# Patient Record
Sex: FEMALE | Race: WHITE | NOT HISPANIC OR LATINO | Employment: UNEMPLOYED | URBAN - METROPOLITAN AREA
[De-identification: names, ages, dates, MRNs, and addresses within clinical notes are randomized per-mention and may not be internally consistent; named-entity substitution may affect disease eponyms.]

---

## 2018-06-29 ENCOUNTER — HOSPITAL ENCOUNTER (OUTPATIENT)
Dept: RADIOLOGY | Facility: HOSPITAL | Age: 38
Discharge: HOME | End: 2018-06-29
Payer: MEDICAID

## 2018-06-29 ENCOUNTER — TRANSCRIBE ORDERS (OUTPATIENT)
Dept: ADMINISTRATIVE | Facility: HOSPITAL | Age: 38
End: 2018-06-29

## 2018-06-29 DIAGNOSIS — R06.02 SHORTNESS OF BREATH: Primary | ICD-10-CM

## 2018-06-29 DIAGNOSIS — R06.02 SHORTNESS OF BREATH: ICD-10-CM

## 2018-06-29 PROCEDURE — 71046 X-RAY EXAM CHEST 2 VIEWS: CPT

## 2018-07-05 ENCOUNTER — HOSPITAL ENCOUNTER (EMERGENCY)
Facility: HOSPITAL | Age: 38
Discharge: HOME | End: 2018-07-06
Attending: EMERGENCY MEDICINE
Payer: MEDICAID

## 2018-07-05 ENCOUNTER — APPOINTMENT (EMERGENCY)
Dept: RADIOLOGY | Facility: HOSPITAL | Age: 38
End: 2018-07-05
Payer: MEDICAID

## 2018-07-05 VITALS
SYSTOLIC BLOOD PRESSURE: 117 MMHG | OXYGEN SATURATION: 98 % | RESPIRATION RATE: 16 BRPM | WEIGHT: 170 LBS | BODY MASS INDEX: 29.02 KG/M2 | DIASTOLIC BLOOD PRESSURE: 88 MMHG | HEART RATE: 78 BPM | HEIGHT: 64 IN | TEMPERATURE: 98.4 F

## 2018-07-05 DIAGNOSIS — J40 BRONCHITIS: Primary | ICD-10-CM

## 2018-07-05 LAB
ATRIAL RATE: 86 BPM
BACTERIA UR QL AUTO: ABNORMAL /HPF
BILIRUB UR QL STRIP: NEGATIVE
CLARITY UR: CLEAR
COLOR UR: YELLOW
EXT PREG TEST URINE: NORMAL
GLUCOSE UR STRIP-MCNC: NEGATIVE MG/DL
HGB UR QL STRIP.AUTO: NEGATIVE
KETONES UR STRIP-MCNC: NEGATIVE MG/DL
LEUKOCYTE ESTERASE UR QL STRIP: ABNORMAL
NITRITE UR QL STRIP: NEGATIVE
NON-SQ EPI CELLS URNS QL MICRO: ABNORMAL /HPF
P AXIS: 54 DEGREES
PH UR STRIP.AUTO: 7.5 [PH] (ref 4.5–8)
PR INTERVAL: 132 MS
PROT UR STRIP-MCNC: NEGATIVE MG/DL
QRS AXIS: 96 DEGREES
QRSD INTERVAL: 80 MS
QT INTERVAL: 378 MS
QTC INTERVAL: 452 MS
RBC #/AREA URNS AUTO: ABNORMAL /HPF
SP GR UR STRIP.AUTO: 1.01 (ref 1–1.03)
T WAVE AXIS: 30 DEGREES
UROBILINOGEN UR QL STRIP.AUTO: 0.2 E.U./DL
VENTRICULAR RATE: 86 BPM
WBC #/AREA URNS AUTO: ABNORMAL /HPF

## 2018-07-05 PROCEDURE — 81025 URINE PREGNANCY TEST: CPT | Performed by: EMERGENCY MEDICINE

## 2018-07-05 PROCEDURE — 81001 URINALYSIS AUTO W/SCOPE: CPT | Performed by: EMERGENCY MEDICINE

## 2018-07-05 PROCEDURE — 71046 X-RAY EXAM CHEST 2 VIEWS: CPT

## 2018-07-05 PROCEDURE — 93005 ELECTROCARDIOGRAM TRACING: CPT

## 2018-07-05 PROCEDURE — 93010 ELECTROCARDIOGRAM REPORT: CPT | Performed by: INTERNAL MEDICINE

## 2018-07-05 RX ORDER — IBUPROFEN 600 MG/1
600 TABLET ORAL ONCE
Status: COMPLETED | OUTPATIENT
Start: 2018-07-05 | End: 2018-07-05

## 2018-07-05 RX ORDER — AZITHROMYCIN 250 MG/1
500 TABLET, FILM COATED ORAL ONCE
Status: COMPLETED | OUTPATIENT
Start: 2018-07-05 | End: 2018-07-05

## 2018-07-05 RX ORDER — ALBUTEROL SULFATE 90 UG/1
2 AEROSOL, METERED RESPIRATORY (INHALATION) ONCE
Status: COMPLETED | OUTPATIENT
Start: 2018-07-05 | End: 2018-07-05

## 2018-07-05 RX ORDER — ALBUTEROL SULFATE 90 UG/1
2 AEROSOL, METERED RESPIRATORY (INHALATION) EVERY 4 HOURS PRN
Qty: 1 INHALER | Refills: 0 | Status: SHIPPED | OUTPATIENT
Start: 2018-07-05 | End: 2019-07-05

## 2018-07-05 RX ORDER — ALBUTEROL SULFATE 2.5 MG/3ML
5 SOLUTION RESPIRATORY (INHALATION) ONCE
Status: COMPLETED | OUTPATIENT
Start: 2018-07-05 | End: 2018-07-05

## 2018-07-05 RX ORDER — PREDNISONE 50 MG/1
50 TABLET ORAL DAILY
Qty: 5 TABLET | Refills: 0 | Status: SHIPPED | OUTPATIENT
Start: 2018-07-05 | End: 2018-07-10

## 2018-07-05 RX ORDER — AZITHROMYCIN 250 MG/1
TABLET, FILM COATED ORAL
Qty: 6 TABLET | Refills: 0 | Status: SHIPPED | OUTPATIENT
Start: 2018-07-06 | End: 2018-07-09

## 2018-07-05 RX ADMIN — ALBUTEROL SULFATE 5 MG: 2.5 SOLUTION RESPIRATORY (INHALATION) at 20:46

## 2018-07-05 RX ADMIN — IPRATROPIUM BROMIDE 0.5 MG: 0.5 SOLUTION RESPIRATORY (INHALATION) at 20:47

## 2018-07-05 RX ADMIN — AZITHROMYCIN 500 MG: 250 TABLET, FILM COATED ORAL at 22:51

## 2018-07-05 RX ADMIN — ALBUTEROL SULFATE 2 PUFF: 90 AEROSOL, METERED RESPIRATORY (INHALATION) at 22:51

## 2018-07-05 RX ADMIN — PREDNISONE 50 MG: 20 TABLET ORAL at 22:51

## 2018-07-05 RX ADMIN — CARBAMIDE PEROXIDE 6.5% 5 DROP: 6.5 LIQUID AURICULAR (OTIC) at 22:51

## 2018-07-05 RX ADMIN — IBUPROFEN 600 MG: 600 TABLET ORAL at 20:57

## 2018-07-06 PROCEDURE — 94640 AIRWAY INHALATION TREATMENT: CPT

## 2018-07-06 PROCEDURE — 99285 EMERGENCY DEPT VISIT HI MDM: CPT

## 2018-07-06 NOTE — DISCHARGE INSTRUCTIONS
Acute Bronchitis   WHAT YOU NEED TO KNOW:   Acute bronchitis is swelling and irritation in the air passages of your lungs  This irritation may cause you to cough or have other breathing problems  Acute bronchitis often starts because of another illness, such as a cold or the flu  The illness spreads from your nose and throat to your windpipe and airways  Bronchitis is often called a chest cold  Acute bronchitis lasts about 3 to 6 weeks and is usually not a serious illness  Your cough can last for several weeks  DISCHARGE INSTRUCTIONS:   Return to the emergency department if:   · You cough up blood  · Your lips or fingernails turn blue  · You feel like you are not getting enough air when you breathe  Contact your healthcare provider if:   · You have a fever  · Your breathing problems do not go away or get worse  · Your cough does not get better within 4 weeks  · You have questions or concerns about your condition or care  Self-care:   · Get more rest   Rest helps your body to heal  Slowly start to do more each day  Rest when you feel it is needed  · Avoid irritants in the air  Avoid chemicals, fumes, and dust  Wear a face mask if you must work around dust or fumes  Stay inside on days when air pollution levels are high  If you have allergies, stay inside when pollen counts are high  Do not use aerosol products, such as spray-on deodorant, bug spray, and hair spray  · Do not smoke or be around others who smoke  Nicotine and other chemicals in cigarettes and cigars damages the cilia that move mucus out of your lungs  Ask your healthcare provider for information if you currently smoke and need help to quit  E-cigarettes or smokeless tobacco still contain nicotine  Talk to your healthcare provider before you use these products  · Drink liquids as directed  Liquids help keep your air passages moist and help you cough up mucus   You may need to drink more liquids when you have acute bronchitis  Ask how much liquid to drink each day and which liquids are best for you  · Use a humidifier or vaporizer  Use a cool mist humidifier or a vaporizer to increase air moisture in your home  This may make it easier for you to breathe and help decrease your cough  Decrease risk for acute bronchitis:   · Get the vaccinations you need  Ask your healthcare provider if you should get vaccinated against the flu or pneumonia  · Prevent the spread of germs  You can decrease your risk of acute bronchitis and other illnesses by doing the following:     [facility] your hands often with soap and water  Carry germ-killing hand lotion or gel with you  You can use the lotion or gel to clean your hands when soap and water are not available  ¨ Do not touch your eyes, nose, or mouth unless you have washed your hands first     ¨ Always cover your mouth when you cough to prevent the spread of germs  It is best to cough into a tissue or your shirt sleeve instead of into your hand  Ask those around you cover their mouths when they cough  ¨ Try to avoid people who have a cold or the flu  If you are sick, stay away from others as much as possible  Medicines: Your healthcare provider may  give you any of the following:  · Ibuprofen or acetaminophen  are medicines that help lower your fever  They are available without a doctor's order  Ask your healthcare provider which medicine is right for you  Ask how much to take and how often to take it  Follow directions  These medicines can cause stomach bleeding if not taken correctly  Ibuprofen can cause kidney damage  Do not take ibuprofen if you have kidney disease, an ulcer, or allergies to aspirin  Acetaminophen can cause liver damage  Do not take more than 4,000 milligrams in 24 hours  · Decongestants  help loosen mucus in your lungs and make it easier to cough up  This can help you breathe easier  · Cough suppressants  decrease your urge to cough   If your cough produces mucus, do not take a cough suppressant unless your healthcare provider tells you to  Your healthcare provider may suggest that you take a cough suppressant at night so you can rest     · Inhalers  may be given  Your healthcare provider may give you one or more inhalers to help you breathe easier and cough less  An inhaler gives your medicine to open your airways  Ask your healthcare provider to show you how to use your inhaler correctly  · Take your medicine as directed  Contact your healthcare provider if you think your medicine is not helping or if you have side effects  Tell him of her if you are allergic to any medicine  Keep a list of the medicines, vitamins, and herbs you take  Include the amounts, and when and why you take them  Bring the list or the pill bottles to follow-up visits  Carry your medicine list with you in case of an emergency  Follow up with your healthcare provider as directed:  Write down questions you have so you will remember to ask them during your follow-up visits  © 2017 [employer] Information is for End User's use only and may not be sold, redistributed or otherwise used for commercial purposes  All illustrations and images included in CareNotes® are the copyrighted property of [employer]  or [employer]  The above information is an  only  It is not intended as medical advice for individual conditions or treatments  Talk to your doctor, nurse or pharmacist before following any medical regimen to see if it is safe and effective for you

## 2018-07-06 NOTE — ED PROVIDER NOTES
History  Chief Complaint   Patient presents with    Chest Pain     Patient c/o chest pain that she states started last Friday and has been on going since that time and getting increasingly worse  Patient comes from [facility]  75-year-old female with past medical history of prior alcoholism and here when abuse, who smokes is coming in today with complaint of chest pain associated with cough along with sinus congestion and right ear pain that has been going on for the past 1 week  She is currently in a drug rehabilitation facility here and has been clean from substances for over the past week  She has had subjective but no actual documented fevers no vomiting or diarrhea  States her boyfriend was recently just diagnosed with pneumonia  No missed periods  No leg pain or swelling  She had a chest x-ray done at the rehab facility and they said it looked clear  She came in because she feels like her symptoms are not improving  Patient has gotten treated as bronchitis in the past with steroids which have helped  History provided by:  Patient  Chest Pain   Pain location:  Substernal area  Pain quality: aching    Pain radiates to:  Does not radiate  Pain radiates to the back: no    Pain severity:  Moderate  Onset quality:  Gradual  Duration:  1 week  Timing:  Constant  Progression:  Worsening  Chronicity:  New  Context: breathing    Context: not lifting    Relieved by:  Nothing  Worsened by:  Nothing tried  Ineffective treatments: OTC meds  Associated symptoms: cough and fatigue    Associated symptoms: no nausea, no near-syncope, no syncope and not vomiting    Risk factors: smoking        None       Past Medical History:   Diagnosis Date    Endometriosis        Past Surgical History:   Procedure Laterality Date    ABDOMINAL SURGERY         History reviewed  No pertinent family history  I have reviewed and agree with the history as documented      Social History   Substance Use Topics  Smoking status: Current Every Day Smoker    Smokeless tobacco: Never Used    Alcohol use No        Review of Systems   Constitutional: Positive for fatigue  Respiratory: Positive for cough  Cardiovascular: Positive for chest pain  Negative for syncope and near-syncope  Gastrointestinal: Negative for nausea and vomiting  All other systems reviewed and are negative  Physical Exam  Physical Exam   Constitutional: She appears well-developed and well-nourished  No distress  HENT:   Head: Normocephalic and atraumatic  Right Ear: Tympanic membrane normal  Right ear exhibits lacerations (small abrasion to right ear canal from pt scratching, small amount of wax)  Left Ear: Tympanic membrane and ear canal normal    Eyes: EOM are normal  Pupils are equal, round, and reactive to light  Cardiovascular: Normal rate and regular rhythm  No murmur heard  Pulmonary/Chest: Effort normal  No respiratory distress  She has wheezes (few scattered wheezes with cough)  Abdominal: Soft  Bowel sounds are normal  She exhibits no distension  There is no tenderness  Musculoskeletal: She exhibits no edema or tenderness  Neurological: She is alert  Skin: She is not diaphoretic  Vitals reviewed        Vital Signs  ED Triage Vitals   Temperature Pulse Respirations Blood Pressure SpO2   07/05/18 1820 07/05/18 1820 07/05/18 1820 07/05/18 1820 07/05/18 1820   98 4 °F (36 9 °C) 80 16 120/79 96 %      Temp src Heart Rate Source Patient Position - Orthostatic VS BP Location FiO2 (%)   -- 07/05/18 1820 07/05/18 1820 07/05/18 1820 --    Monitor Sitting Right arm       Pain Score       07/05/18 2254       5           Vitals:    07/05/18 1820 07/05/18 2254   BP: 120/79 117/88   Pulse: 80 78   Patient Position - Orthostatic VS: Sitting        Visual Acuity      ED Medications  Medications   albuterol inhalation solution 5 mg (5 mg Nebulization Given 7/5/18 2046)   ipratropium (ATROVENT) 0 02 % inhalation solution 0 5 mg (0 5 mg Nebulization Given 7/5/18 2047)   ibuprofen (MOTRIN) tablet 600 mg (600 mg Oral Given 7/5/18 2057)   azithromycin (ZITHROMAX) tablet 500 mg (500 mg Oral Given 7/5/18 2251)   albuterol (PROVENTIL HFA,VENTOLIN HFA) inhaler 2 puff (2 puffs Inhalation Given 7/5/18 2251)   predniSONE tablet 50 mg (50 mg Oral Given 7/5/18 2251)   carbamide peroxide (DEBROX) 6 5 % otic solution 5 drop (5 drops Right Ear Given 7/5/18 2251)       Diagnostic Studies  Results Reviewed     Procedure Component Value Units Date/Time    Urine Microscopic [[id number]]  (Abnormal) Collected:  07/05/18 2058    Lab Status:  Final result Specimen:  Urine from Urine, Clean Catch Updated:  07/05/18 2113     RBC, UA None Seen /hpf      WBC, UA 2-4 (A) /hpf      Epithelial Cells Occasional /hpf      Bacteria, UA Moderate (A) /hpf     POCT pregnancy, urine [[id number]]  (Normal) Resulted:  07/05/18 2112    Lab Status:  Final result Updated:  07/05/18 2112     EXT PREG TEST UR (Ref: Negative) neg    UA w Reflex to Microscopic [[id number]]  (Abnormal) Collected:  07/05/18 2058    Lab Status:  Final result Specimen:  Urine from Urine, Clean Catch Updated:  07/05/18 2107     Color, UA Yellow     Clarity, UA Clear     Specific Gravity, UA 1 010     pH, UA 7 5     Leukocytes, UA Trace (A)     Nitrite, UA Negative     Protein, UA Negative mg/dl      Glucose, UA Negative mg/dl      Ketones, UA Negative mg/dl      Urobilinogen, UA 0 2 E U /dl      Bilirubin, UA Negative     Blood, UA Negative                 XR chest 2 views   ED Interpretation by Mariella Pizano MD (07/05 2158)   NAD                 Procedures  ECG 12 Lead Documentation  Date/Time: 7/5/2018 11:20 PM  Performed by: Josse Champagne  Authorized by: Josse Champagne     Indications / Diagnosis:  Cough/CP  ECG reviewed by me, the ED Provider: yes    Patient location:  ED  Rate:     ECG rate:  86    ECG rate assessment: normal    QRS:     QRS axis:  Right  T waves:     T waves: normal Phone Contacts  ED Phone Contact    ED Course                               MDM  Number of Diagnoses or Management Options  Bronchitis: new and requires workup     Amount and/or Complexity of Data Reviewed  Tests in the radiology section of CPT®: ordered and reviewed  Independent visualization of images, tracings, or specimens: yes    Patient Progress  Patient progress: improved (Patient felt improved after the neb treatment and was able to cough up some mucus  Discussed with her treatment for bronchitis with steroids, inhaler and a Z-Ronny given her smoking history and history of symptoms for 1 week  Patient asking for an ear drop for the lacks of her right ear  Discussed with her worsening signs and symptoms to return emergency department for )    CritCare Time    Disposition  Final diagnoses:   Bronchitis     Time reflects when diagnosis was documented in both MDM as applicable and the Disposition within this note     Time User Action Codes Description Comment    7/5/2018 10:18 PM Reanna Varela, [facility] Bronchitis       ED Disposition     ED Disposition Condition Comment    Discharge  Constance Gregory discharge to home/self care  Condition at discharge: Good        Follow-up Information     Follow up With Specialties Details Why Contact Info Additional [address] [facility] Emergency Department Emergency Medicine  If symptoms worsen [address] [place] [address]  [phone] [place] ED, [address] [place], [place], [facility]          Patient's Medications   Discharge Prescriptions    ALBUTEROL (PROVENTIL HFA,VENTOLIN HFA) 90 MCG/ACT INHALER    Inhale 2 puffs every 4 (four) hours as needed for wheezing       Start Date: 7/5/2018  End Date: 7/5/2019       Order Dose: 2 puffs       Quantity: 1 Inhaler    Refills: 0    AZITHROMYCIN (ZITHROMAX Z-RONNY) 250 MG TABLET    Take 2 tablets by mouth on day 1, then 1 tablet daily for the remaining 4 days  Start Date: 7/6/2018  End Date: 7/9/2018       Order Dose: --       Quantity: 6 tablet    Refills: 0    PREDNISONE 50 MG TABLET    Take 1 tablet (50 mg total) by mouth daily for 5 days       Start Date: 7/5/2018  End Date: 7/10/2018       Order Dose: 50 mg       Quantity: 5 tablet    Refills: 0     No discharge procedures on file      ED Provider  Electronically Signed by           Mychal Arevalo MD  07/05/18 4044

## 2018-07-08 ENCOUNTER — HOSPITAL ENCOUNTER (EMERGENCY)
Facility: HOSPITAL | Age: 38
Discharge: HOME | End: 2018-07-08
Attending: EMERGENCY MEDICINE | Admitting: EMERGENCY MEDICINE
Payer: MEDICAID

## 2018-07-08 VITALS
RESPIRATION RATE: 16 BRPM | DIASTOLIC BLOOD PRESSURE: 76 MMHG | OXYGEN SATURATION: 98 % | SYSTOLIC BLOOD PRESSURE: 124 MMHG | HEART RATE: 88 BPM | TEMPERATURE: 98.7 F

## 2018-07-08 DIAGNOSIS — F44.5 PSYCHOGENIC NONEPILEPTIC SEIZURE: Primary | ICD-10-CM

## 2018-07-08 DIAGNOSIS — J40 BRONCHITIS: ICD-10-CM

## 2018-07-08 LAB
ANION GAP SERPL CALCULATED.3IONS-SCNC: 10 MMOL/L (ref 4–13)
BASOPHILS # BLD AUTO: 0.01 THOUSANDS/ΜL (ref 0–0.1)
BASOPHILS NFR BLD AUTO: 0 % (ref 0–1)
BUN SERPL-MCNC: 10 MG/DL (ref 5–25)
CALCIUM SERPL-MCNC: 9.4 MG/DL (ref 8.3–10.1)
CHLORIDE SERPL-SCNC: 103 MMOL/L (ref 100–108)
CO2 SERPL-SCNC: 24 MMOL/L (ref 21–32)
CREAT SERPL-MCNC: 0.93 MG/DL (ref 0.6–1.3)
EOSINOPHIL # BLD AUTO: 0 THOUSAND/ΜL (ref 0–0.61)
EOSINOPHIL NFR BLD AUTO: 0 % (ref 0–6)
ERYTHROCYTE [DISTWIDTH] IN BLOOD BY AUTOMATED COUNT: 12.6 % (ref 11.6–15.1)
GFR SERPL CREATININE-BSD FRML MDRD: 78 ML/MIN/1.73SQ M
GLUCOSE SERPL-MCNC: 126 MG/DL (ref 65–140)
HCT VFR BLD AUTO: 39.4 % (ref 34.8–46.1)
HGB BLD-MCNC: 13.4 G/DL (ref 11.5–15.4)
IMM GRANULOCYTES # BLD AUTO: 0.03 THOUSAND/UL (ref 0–0.2)
IMM GRANULOCYTES NFR BLD AUTO: 1 % (ref 0–2)
LYMPHOCYTES # BLD AUTO: 0.88 THOUSANDS/ΜL (ref 0.6–4.47)
LYMPHOCYTES NFR BLD AUTO: 17 % (ref 14–44)
MCH RBC QN AUTO: 27.8 PG (ref 26.8–34.3)
MCHC RBC AUTO-ENTMCNC: 34 G/DL (ref 31.4–37.4)
MCV RBC AUTO: 82 FL (ref 82–98)
MONOCYTES # BLD AUTO: 0.22 THOUSAND/ΜL (ref 0.17–1.22)
MONOCYTES NFR BLD AUTO: 4 % (ref 4–12)
NEUTROPHILS # BLD AUTO: 4.04 THOUSANDS/ΜL (ref 1.85–7.62)
NEUTS SEG NFR BLD AUTO: 78 % (ref 43–75)
NRBC BLD AUTO-RTO: 0 /100 WBCS
PLATELET # BLD AUTO: 311 THOUSANDS/UL (ref 149–390)
PMV BLD AUTO: 10.3 FL (ref 8.9–12.7)
POTASSIUM SERPL-SCNC: 4.1 MMOL/L (ref 3.5–5.3)
RBC # BLD AUTO: 4.82 MILLION/UL (ref 3.81–5.12)
SODIUM SERPL-SCNC: 137 MMOL/L (ref 136–145)
WBC # BLD AUTO: 5.18 THOUSAND/UL (ref 4.31–10.16)

## 2018-07-08 PROCEDURE — 96360 HYDRATION IV INFUSION INIT: CPT

## 2018-07-08 PROCEDURE — 36415 COLL VENOUS BLD VENIPUNCTURE: CPT | Performed by: EMERGENCY MEDICINE

## 2018-07-08 PROCEDURE — 99284 EMERGENCY DEPT VISIT MOD MDM: CPT

## 2018-07-08 PROCEDURE — 96361 HYDRATE IV INFUSION ADD-ON: CPT

## 2018-07-08 PROCEDURE — 85025 COMPLETE CBC W/AUTO DIFF WBC: CPT | Performed by: EMERGENCY MEDICINE

## 2018-07-08 PROCEDURE — 96372 THER/PROPH/DIAG INJ SC/IM: CPT

## 2018-07-08 PROCEDURE — 80048 BASIC METABOLIC PNL TOTAL CA: CPT | Performed by: EMERGENCY MEDICINE

## 2018-07-08 RX ORDER — ALBUTEROL SULFATE 90 UG/1
2 AEROSOL, METERED RESPIRATORY (INHALATION) ONCE
Status: COMPLETED | OUTPATIENT
Start: 2018-07-08 | End: 2018-07-08

## 2018-07-08 RX ORDER — OLANZAPINE 10 MG/1
10 INJECTION, POWDER, LYOPHILIZED, FOR SOLUTION INTRAMUSCULAR ONCE
Status: COMPLETED | OUTPATIENT
Start: 2018-07-08 | End: 2018-07-08

## 2018-07-08 RX ORDER — ROPINIROLE 0.25 MG/1
0.25 TABLET, FILM COATED ORAL ONCE
Status: COMPLETED | OUTPATIENT
Start: 2018-07-08 | End: 2018-07-08

## 2018-07-08 RX ADMIN — OLANZAPINE 10 MG: 10 INJECTION, POWDER, FOR SOLUTION INTRAMUSCULAR at 18:27

## 2018-07-08 RX ADMIN — SODIUM CHLORIDE 1000 ML: 0.9 INJECTION, SOLUTION INTRAVENOUS at 19:44

## 2018-07-08 RX ADMIN — ROPINIROLE 0.25 MG: 0.25 TABLET, FILM COATED ORAL at 21:48

## 2018-07-08 RX ADMIN — ALBUTEROL SULFATE 2 PUFF: 90 AEROSOL, METERED RESPIRATORY (INHALATION) at 19:45

## 2018-07-08 RX ADMIN — WATER 10 ML: 1 INJECTION INTRAMUSCULAR; INTRAVENOUS; SUBCUTANEOUS at 18:28

## 2018-07-08 NOTE — ED PROVIDER NOTES
History  Chief Complaint   Patient presents with    Seizure - Prior Hx Of     pt presents from [facility] for a report of seizures, pt presents flopping around, pt alert and being verbally agressive toward staff  75-year-old female sent in from [facility] recovery, she is is experiencing psychogenic seizures  She has purposeful tonic clonic shaking  When I tell the patient that these are psychogenic in nature, the patient is able to stop her seizure-like activity  She is able to talk with me through her seizure-like activity  She has no neurologic deficit  She has what appears to be purposeful tonic clonic movements  She is at [facility] recovery to attempt to get over benzo and heroin addiction  She has not had benzos or heroin in the past 2 weeks and I do not think that this is benzodiazepine withdrawal   This appears like psychogenic seizures  Patient is feeling improved after Zyprexa  Prior to Admission Medications   Prescriptions Last Dose Informant Patient Reported? Taking? albuterol (PROVENTIL HFA,VENTOLIN HFA) 90 mcg/act inhaler   No No   Sig: Inhale 2 puffs every 4 (four) hours as needed for wheezing   azithromycin (ZITHROMAX Z-MARY LOU) 250 mg tablet   No No   Sig: Take 2 tablets by mouth on day 1, then 1 tablet daily for the remaining 4 days  predniSONE 50 mg tablet   No No   Sig: Take 1 tablet (50 mg total) by mouth daily for 5 days      Facility-Administered Medications: None       Past Medical History:   Diagnosis Date    Endometriosis        Past Surgical History:   Procedure Laterality Date    ABDOMINAL SURGERY         History reviewed  No pertinent family history  I have reviewed and agree with the history as documented      Social History   Substance Use Topics    Smoking status: Current Every Day Smoker    Smokeless tobacco: Never Used    Alcohol use No        Review of Systems   Unable to perform ROS: Acuity of condition   Constitutional: Negative for chills and fever  Gastrointestinal: Negative for abdominal pain, nausea and vomiting  Genitourinary: Negative for dysuria and flank pain  Skin: Negative for rash and wound  Neurological: Negative for light-headedness and headaches  Psychiatric/Behavioral: Positive for sleep disturbance  Negative for confusion and hallucinations  The patient is nervous/anxious  Psychogenic seizures       Physical Exam  Physical Exam   Constitutional: She is oriented to person, place, and time  She appears well-developed and well-nourished  No distress  HENT:   Head: Normocephalic and atraumatic  Mouth/Throat: Oropharynx is clear and moist    Eyes: Conjunctivae and EOM are normal    Neck: Normal range of motion  Cardiovascular: Normal rate and regular rhythm  Pulmonary/Chest: Effort normal  No respiratory distress  Abdominal: Soft  There is no tenderness  Musculoskeletal: Normal range of motion  She exhibits no tenderness  Neurological: She is alert and oriented to person, place, and time  No cranial nerve deficit or sensory deficit  Repeated psychogenic seizures while in the evaulation room - able to talk through them  Purposeful tonic-clonic movements however does not appear seizure like and patient is able to stop when told to stop the behavior  Skin: Skin is warm and dry  She is not diaphoretic  No pallor  Psychiatric:   Psychogenic seizure   Vitals reviewed        Vital Signs  ED Triage Vitals [07/08/18 1819]   Temperature Pulse Respirations Blood Pressure SpO2   98 7 °F (37 1 °C) 99 18 128/87 97 %      Temp Source Heart Rate Source Patient Position - Orthostatic VS BP Location FiO2 (%)   Oral Monitor Lying Right arm --      Pain Score       --           Vitals:    07/08/18 1819 07/08/18 2039   BP: 128/87 124/76   Pulse: 99 88   Patient Position - Orthostatic VS: Lying        Visual Acuity  Visual Acuity      Most Recent Value   L Pupil Size (mm)  3   R Pupil Size (mm)  3          ED Medications  Medications   rOPINIRole (REQUIP) tablet 0 25 mg (not administered)   OLANZapine (ZyPREXA) IM injection 10 mg (10 mg Intramuscular Given 7/8/18 1827)   sterile water injection **AcuDose Override Pull** (10 mL  Given 7/8/18 1828)   sodium chloride 0 9 % bolus 1,000 mL (1,000 mL Intravenous New Bag 7/8/18 1944)   albuterol (PROVENTIL HFA,VENTOLIN HFA) inhaler 2 puff (2 puffs Inhalation Given 7/8/18 1945)       Diagnostic Studies  Results Reviewed     Procedure Component Value Units Date/Time    Basic metabolic panel [[id number]] Collected:  07/08/18 1943    Lab Status:  Final result Specimen:  Blood from Arm, Left Updated:  07/08/18 2002     Sodium 137 mmol/L      Potassium 4 1 mmol/L      Chloride 103 mmol/L      CO2 24 mmol/L      Anion Gap 10 mmol/L      BUN 10 mg/dL      Creatinine 0 93 mg/dL      Glucose 126 mg/dL      Calcium 9 4 mg/dL      eGFR 78 ml/min/1 73sq m     Narrative:         National Kidney Disease Education Program recommendations are as follows:  GFR calculation is accurate only with a steady state creatinine  Chronic Kidney disease less than 60 ml/min/1 73 sq  meters  Kidney failure less than 15 ml/min/1 73 sq  meters      CBC and differential [[id number]]  (Abnormal) Collected:  07/08/18 1943    Lab Status:  Final result Specimen:  Blood from Arm, Left Updated:  07/08/18 1950     WBC 5 18 Thousand/uL      RBC 4 82 Million/uL      Hemoglobin 13 4 g/dL      Hematocrit 39 4 %      MCV 82 fL      MCH 27 8 pg      MCHC 34 0 g/dL      RDW 12 6 %      MPV 10 3 fL      Platelets 459 Thousands/uL      nRBC 0 /100 WBCs      Neutrophils Relative 78 (H) %      Immat GRANS % 1 %      Lymphocytes Relative 17 %      Monocytes Relative 4 %      Eosinophils Relative 0 %      Basophils Relative 0 %      Neutrophils Absolute 4 04 Thousands/µL      Immature Grans Absolute 0 03 Thousand/uL      Lymphocytes Absolute 0 88 Thousands/µL      Monocytes Absolute 0 22 Thousand/µL      Eosinophils Absolute 0 00 Thousand/µL      Basophils Absolute 0 01 Thousands/µL     UA w Reflex to Microscopic w Reflex to Culture [[id number]]     Lab Status:  No result Specimen:  Urine                  No orders to display              Procedures  Procedures       Phone Contacts  ED Phone Contact    ED Course  ED Course as of Jul 08 2129   Teresa Jorge Jul 08, 2018   4486 Patient is still not having any seizure-like activity  She is having blood work performed, IV fluid  Hopefully will discharge later today back to her rehabilitation facility  2002 Normal blood work  No cause for repeat seizure activity  This is all likely psychogenic seizures  MDM  Number of Diagnoses or Management Options  Diagnosis management comments: Second Perdue seizures  Patient is advised that she is not having real seizures, they appear psychogenic because she is able to talk through them  As such patient is able to stop herself from having seizures  She is given Zyprexa to help with the psychogenicSymptoms  She is concerned that this is secondary to steroid use so we will stop her steroids, will give her IV fluid to help, check basic lab work  No further imaging or tests is needed as this appears classic psychogenic seizure  Amount and/or Complexity of Data Reviewed  Clinical lab tests: ordered and reviewed      CritCare Time    Disposition  Final diagnoses:   Psychogenic nonepileptic seizure   Bronchitis     Time reflects when diagnosis was documented in both MDM as applicable and the Disposition within this note     Time User Action Codes Description Comment    7/8/2018  9:17 PM Jessie Payan Add [F44 5] Psychogenic nonepileptic seizure     7/8/2018  9:17 PM Sheila, [address] Bronchitis       ED Disposition     ED Disposition Condition Comment    Discharge  Miki Alisson discharge to home/self care      Condition at discharge: Good        Follow-up Information     Follow up With Specialties Details Why Contact Info Additional Information    [address] [facility] Emergency Department Emergency Medicine  If symptoms worsen: repeat seizure activity, fever/chills, headache, etc Hai Brown [facility] [address] [place] ED, [address] [place], [place], [address] [facility]  Call as needed to find a PCP in the area [phone]             Patient's Medications   Discharge Prescriptions    No medications on file     No discharge procedures on file      ED Provider  Electronically Signed by           Lyndon Tsai DO  07/08/18 0289

## 2018-07-09 NOTE — DISCHARGE INSTRUCTIONS
Acute Bronchitis   AMBULATORY CARE:   Acute bronchitis  is swelling and irritation in the air passages of your lungs  This irritation may cause you to cough or have other breathing problems  Acute bronchitis often starts because of another illness, such as a cold or the flu  The illness spreads from your nose and throat to your windpipe and airways  Bronchitis is often called a chest cold  Acute bronchitis lasts about 3 to 6 weeks and is usually not a serious illness  Your cough can last for several weeks  You may have any of the following symptoms:   · A cough with sputum that may be clear, yellow, or green    · Feeling more tired than usual, and body aches    · A fever and chills    · Wheezing when you breathe    · A tight chest or pain when you breathe or cough  Seek care immediately if:   · You cough up blood  · Your lips or fingernails turn blue  · You feel like you are not getting enough air when you breathe  Contact your healthcare provider if:   · You have a fever  · Your breathing problems do not go away or get worse  · Your cough does not get better within 4 weeks  · You have questions or concerns about your condition or care  Self-care:   · Get more rest   Rest helps your body to heal  Slowly start to do more each day  Rest when you feel it is needed  · Avoid irritants in the air  Avoid chemicals, fumes, and dust  Wear a face mask if you must work around dust or fumes  Stay inside on days when air pollution levels are high  If you have allergies, stay inside when pollen counts are high  Do not use aerosol products, such as spray-on deodorant, bug spray, and hair spray  · Do not smoke or be around others who smoke  Nicotine and other chemicals in cigarettes and cigars damages the cilia that move mucus out of your lungs  Ask your healthcare provider for information if you currently smoke and need help to quit  E-cigarettes or smokeless tobacco still contain nicotine   Talk to your healthcare provider before you use these products  · Drink liquids as directed  Liquids help keep your air passages moist and help you cough up mucus  You may need to drink more liquids when you have acute bronchitis  Ask how much liquid to drink each day and which liquids are best for you  · Use a humidifier or vaporizer  Use a cool mist humidifier or a vaporizer to increase air moisture in your home  This may make it easier for you to breathe and help decrease your cough  Prevent acute bronchitis by doing the following:   · Get the vaccinations you need  Ask your healthcare provider if you should get vaccinated against the flu or pneumonia  · Prevent the spread of germs  You can decrease your risk of acute bronchitis and other illnesses by doing the following:     [facility] your hands often with soap and water  Carry germ-killing hand lotion or gel with you  You can use the lotion or gel to clean your hands when soap and water are not available  ¨ Do not touch your eyes, nose, or mouth unless you have washed your hands first     ¨ Always cover your mouth when you cough to prevent the spread of germs  It is best to cough into a tissue or your shirt sleeve instead of into your hand  Ask those around you cover their mouths when they cough  ¨ Try to avoid people who have a cold or the flu  If you are sick, stay away from others as much as possible  Medicines: Your healthcare provider may  give you any of the following:  · Ibuprofen or acetaminophen  are medicines that help lower your fever  They are available without a doctor's order  Ask your healthcare provider which medicine is right for you  Ask how much to take and how often to take it  Follow directions  These medicines can cause stomach bleeding if not taken correctly  Ibuprofen can cause kidney damage  Do not take ibuprofen if you have kidney disease, an ulcer, or allergies to aspirin  Acetaminophen can cause liver damage   Do not take more than 4,000 milligrams in 24 hours  · Decongestants  help loosen mucus in your lungs and make it easier to cough up  This can help you breathe easier  · Cough suppressants  decrease your urge to cough  If your cough produces mucus, do not take a cough suppressant unless your healthcare provider tells you to  Your healthcare provider may suggest that you take a cough suppressant at night so you can rest     · Inhalers  may be given  Your healthcare provider may give you one or more inhalers to help you breathe easier and cough less  An inhaler gives your medicine to open your airways  Ask your healthcare provider to show you how to use your inhaler correctly  Follow up with your healthcare provider as directed:  Write down questions you have so you will remember to ask them during your follow-up visits  © 2017 [employer]  Information is for End User's use only and may not be sold, redistributed or otherwise used for commercial purposes  All illustrations and images included in CareNotes® are the copyrighted property of [employer]  or [employer]  The above information is an  only  It is not intended as medical advice for individual conditions or treatments  Talk to your doctor, nurse or pharmacist before following any medical regimen to see if it is safe and effective for you  Conversion Disorder   WHAT YOU NEED TO KNOW:   What is conversion disorder? Conversion disorder is a condition that causes you to have symptoms of nerve problems you cannot control  It may also be called functional neurologic symptom disorder  The nerve problems are not caused by a medical condition  A stressful or traumatic event usually triggers these problems  Your risk for conversion disorder is higher if you have depression, an anxiety disorder, or posttraumatic stress disorder (PTSD)  What are the signs and symptoms of conversion disorder?    · Numbness or loss of feeling in a body area    · Blindness or tunnel vision    · Hearing problems or deafness    · Not being able to speak    · Weakness or paralysis    · Tremors or jerking motions you cannot control    · Seizures or spells    · Trouble walking  How is conversion disorder diagnosed? Conversion disorder can cause symptoms that look like a medical emergency, such as a stroke or paralysis  Your healthcare provider will check you for a medical condition that could be causing your symptoms  The tests you may need will depend on your symptoms  You may also need to see a neurologist (nerve specialist) to check for problems that need to be treated  If no medical condition is found, your healthcare provider may talk to you about working with a mental health specialist  The specialist can help you talk about any stress or anxiety you are feeling  How is conversion disorder managed? Signs and symptoms of conversion disorder usually last a short time, and treatment is not needed  The following may help you manage conversion disorder and reduce your symptoms:  · Therapy  can help you work through any anxiety or stress you may be feeling  A therapist will talk with you about anything difficult that is happening now or that happened in the past  You can talk with the therapist about what you did to handle the stress  The therapist may also help you understand how your signs and symptoms are related to how you are feeling  You may be able to learn new ways to handle anxiety or stress  You may have therapy alone or with members of your family  You may learn to replace negative thoughts with positive thoughts  · Physical or occupational therapy  can help you as you recover  A physical therapist can teach you exercises to help build muscles or improve balance  An occupational therapist can help you learn new ways to do your daily activities until your symptoms are gone       · Medicines  are sometimes used to help control anxiety or to improve your mood  These medicines are used together with therapy or other treatments  When should I contact my healthcare provider? · Your signs or symptoms come back after treatment  · You have new or worsening signs or symptoms  · You have questions or concerns about your condition or care  CARE AGREEMENT:   You have the right to help plan your care  Learn about your health condition and how it may be treated  Discuss treatment options with your caregivers to decide what care you want to receive  You always have the right to refuse treatment  The above information is an  only  It is not intended as medical advice for individual conditions or treatments  Talk to your doctor, nurse or pharmacist before following any medical regimen to see if it is safe and effective for you  © 2017 [employer]  Information is for End User's use only and may not be sold, redistributed or otherwise used for commercial purposes  All illustrations and images included in CareNotes® are the copyrighted property of [employer]  or [employer]  Recurrent Seizures in Adults   WHAT YOU NEED TO KNOW:   What is a recurrent seizure? A seizure is an episode of abnormal brain activity  A seizure can cause jerky muscle movements, loss of consciousness, or confusion  Recurrent means you have a seizure more than once  The cause of your seizures may not be known  Some common triggers are alcohol, drugs, lack of sleep, fever, or a virus  High or low blood sugar levels can also trigger a seizure  How is a recurrent seizure treated? You may need seizure medicine if you do not already take it  If you currently take seizure medicine, the dose or type of medicine may need be changed  Recurrent seizures may occur if you do not take antiseizure medicine as directed  Surgery may be needed to remove a tumor or fix a problem in your brain  What can I do to help prevent seizures?    · Take your antiseizure medicine every day at the same time  This will also help reduce side effects  Do not skip any doses  Do not stop taking this medicine unless directed by a healthcare provider  · Manage stress  Stress can trigger a seizure  Exercise can help you reduce stress  Talk to your healthcare provider about exercise that is safe for you  Other ways to manage stress include yoga, meditation, and biofeedback  Illness can be a form of stress  Eat a variety of healthy foods and drink plenty of liquids during an illness  · Set a regular sleep schedule  A lack of sleep can trigger a seizure  Try to go to sleep and wake up at the same times every day  Keep your bedroom quiet and dark  Talk to your healthcare provider if you are having trouble sleeping  · Manage other medical conditions  Manage other health conditions that may increase your risk for a seizure  Keep your blood sugar levels and blood pressure under control  · Limit or do not drink alcohol as directed  Alcohol can trigger a seizure, especially if you drink a large amount at one time  A drink of alcohol is 12 ounces of beer, 1½ ounces of liquor, or 5 ounces of wine  Talk to your healthcare provider about a safe amount of alcohol for you  Your provider may recommend that you do not drink any alcohol  Tell him or her if you need help to quit drinking  What can I do to manage recurrent seizures? · Ask what safety precautions you should take  Talk with your healthcare provider about driving  You may not be able to drive until you are seizure-free for a period of time  You will need to check the law where you live  Also talk to your healthcare provider about swimming and bathing  You may drown or develop life-threatening heart or lung damage if you have a seizure in water  · Tell your friends, family members, and coworkers that you had a seizure    Give them the following instructions to use if you have another seizure:     ¨ Do not panic     ¨ Gently guide me to the floor or a soft surface  ¨ Do not hold me down or put anything in my mouth  ¨ Place me on my side to help prevent me from swallowing saliva or vomit  ¨ Protect me from injury  Remove sharp or hard objects from the area surrounding me, or cushion my head  ¨ Loosen the clothing around my head and neck  ¨ Time how long my seizure lasts  Call 911 if my seizure lasts longer than 5 minutes or if I have a second seizure  ¨ Stay with me until my seizure ends  Let me rest until I am fully awake  ¨ Perform CPR if I stop breathing or you cannot feel my pulse  ¨ Do not give me anything to eat or drink until I am fully awake  Call 911 or have someone else call for any of the following:   · Your seizure lasts longer than 5 minutes  · You have a second seizure within 24 hours of your first     · You have trouble breathing after a seizure  · You cannot be woken after your seizure  · You have more than 1 seizure before you are fully awake or aware  · You have diabetes or are pregnant and have a seizure  · You have a seizure in water  When should I seek immediate care? · You are injured during a seizure  When should I contact my healthcare provider? · You have a fever  · You are planning to get pregnant or are currently pregnant  · You have questions or concerns about your condition or care  CARE AGREEMENT:   You have the right to help plan your care  Learn about your health condition and how it may be treated  Discuss treatment options with your caregivers to decide what care you want to receive  You always have the right to refuse treatment  The above information is an  only  It is not intended as medical advice for individual conditions or treatments  Talk to your doctor, nurse or pharmacist before following any medical regimen to see if it is safe and effective for you    © 2017 [employer]  Information is for End User's use only and may not be sold, redistributed or otherwise used for commercial purposes  All illustrations and images included in CareNotes® are the copyrighted property of [employer]  or [employer]

## 2018-07-09 NOTE — ED NOTES
Called [facility] x 4 times before reaching a nurse  Instructed to fax discharge paperwork for them to arrange transportation back to facility        Adrienne Green RN  07/08/18 [id number]

## 2018-10-05 ENCOUNTER — HOSPITAL ENCOUNTER (INPATIENT)
Dept: HOSPITAL 74 - YASAS | Age: 38
LOS: 5 days | Discharge: HOME | DRG: 773 | End: 2018-10-10
Attending: INTERNAL MEDICINE | Admitting: INTERNAL MEDICINE
Payer: COMMERCIAL

## 2018-10-05 VITALS — BODY MASS INDEX: 28.1 KG/M2

## 2018-10-05 DIAGNOSIS — L03.115: ICD-10-CM

## 2018-10-05 DIAGNOSIS — Z86.69: ICD-10-CM

## 2018-10-05 DIAGNOSIS — F13.230: ICD-10-CM

## 2018-10-05 DIAGNOSIS — G47.00: ICD-10-CM

## 2018-10-05 DIAGNOSIS — F17.210: ICD-10-CM

## 2018-10-05 DIAGNOSIS — F41.9: ICD-10-CM

## 2018-10-05 DIAGNOSIS — F19.24: ICD-10-CM

## 2018-10-05 DIAGNOSIS — F11.23: Primary | ICD-10-CM

## 2018-10-05 DIAGNOSIS — B18.2: ICD-10-CM

## 2018-10-05 DIAGNOSIS — F32.9: ICD-10-CM

## 2018-10-05 PROCEDURE — HZ2ZZZZ DETOXIFICATION SERVICES FOR SUBSTANCE ABUSE TREATMENT: ICD-10-PCS | Performed by: SURGERY

## 2018-10-05 RX ADMIN — NICOTINE SCH: 21 PATCH TRANSDERMAL at 18:24

## 2018-10-05 RX ADMIN — DOCUSATE SODIUM SCH MG: 100 CAPSULE, LIQUID FILLED ORAL at 22:42

## 2018-10-05 RX ADMIN — Medication SCH: at 23:24

## 2018-10-05 RX ADMIN — NICOTINE POLACRILEX PRN MG: 2 GUM, CHEWING ORAL at 18:24

## 2018-10-05 RX ADMIN — HYDROXYZINE PAMOATE PRN MG: 25 CAPSULE ORAL at 18:26

## 2018-10-05 RX ADMIN — LEVETIRACETAM SCH MG: 250 TABLET, FILM COATED ORAL at 22:42

## 2018-10-05 NOTE — HP
COWS





- Scale


Resting Pulse: 0= MD 80 or Below


Sweatin= Chills/Flushing


Restless Observation: 3= Extraneous Movement


Pupil Size: 1= Pupils >than Normal


Bone or Joint Aches: 2= Severe Diffuse Aches


Runny Nose/ Eye Tearin= Runny Nose/Eyes


GI Upset > 30mins: 2= Nausea/Diarrhea


Tremor Observation: 2= Slight Tremor Visible


Yawning Observation: 2= >3x During Session


Anxiety or Irritability: 2=Irritable/Anxious


Goose Flesh Skin: 0=Smooth Skin


COWS Score: 17





[facility]





- hospitals


Chief Complaint: 





i need help to sto using heroin


Allergies/Adverse Reactions: 


 Allergies











Allergy/AdvReac Type Severity Reaction Status Date / Time


 


No Known Allergies Allergy   Verified 10/05/18 13:48











History of Present Illness: 





this 37 years old female with heroin dependence,seeking detox,withdrawal symptom

[facility] 


hepat[facility] c treated 


anxiety,depression,insomnia


iv drug user heroin


weight loss


seizure last 


anxiety,depression,insomnia


swelling with pain in the right ankle for 1 week at the siite of injection


Exam Limitations: No Limitations





- Ebola screening


Have you traveled outside of the country in the last 21 days: No


Have you had contact with anyone from an Ebola affected area: No


Have you been sick,other than usual withdrawal symptoms: No


Do you have a fever: No





- Review of Systems


Constitutional: Chills, Loss of Appetite, Malaise, Night Sweats, Changes in 

sleep, Weakness, Unintentional Wgt. Loss


EENT: reports: Tearing, Ear Pain, Nose Congestion


Respiratory: reports: No Symptoms reported


Cardiac: reports: No Symptoms Reported


GI: reports: Nausea, Poor Appetite, Vomiting, Abdominal cramping


: reports: No Symptoms Reported


Musculoskeletal: reports: Back Pain, Joint Pain, Muscle Pain, Joint Stiffness


Integumentary: reports: Dryness


Neuro: reports: Headache, Tremors


Endocrine: reports: No Symptoms Reported


Hematology: reports: No Symptoms Reported


Psychiatric: reports: No Sypmtoms Reported, Judgement Intact, Mood/Affect 

Appropiate, Orientated x3 (insomnia), Anxious, Depressed





Patient History





- Patient Medical History


Hx Asthma: No


Hx Chronic Obstructive Pulmonary Disease (COPD): No


Hx Cardiac Disorders: No


Hx Hypertension: No


Hx Seizures: Yes (WITHDRAWAL SEIZURE - LAST EPISODE )


Hx Diabetes: No


Hx Gastrointestinal Disorders: No


Hx Genitourinary Disorders: Yes (ENDOMETRIOSIS)


Hx Sexually Transmitted Disorders: No


Hx Renal Disease (ESRD): No


Hx Human Immunodeficiency Virus (HIV): No


Hx Hepatitis C: No


Hx Depression: Yes (AND ANXIETY)


Hx Suicide Attempt: No


Hx Bipolar Disorder: No


Hx Schizophrenia: No


Other Medical History: no suicidal,no homicidal





- Patient Surgical History


Past Surgical History: Yes


Hx Neurologic Surgery: No


Hx Cataract Extraction: No


Hx Cardiac Surgery: No


Hx Lung Surgery: No


Hx Breast Surgery: No


Hx Breast Biopsy: No


Hx Abdominal Surgery: No


Hx Appendectomy: No


Hx Cholecystectomy: No


Hx Genitourinary Surgery: No


Hx  Section: No


Hx Orthopedic Surgery: No


Other Surgical History: SURGERY FOR ENDOMETRIOSIS IN  lap


Anesthesia Reaction: No





- PPD History


Previous Implant?: Yes


Documented Results: Negative w/o proof


Implanted On Prior [facility] Admission?: Yes


Date: 13


PPD to be Administered?: Yes





- Reproductive History


Patient is a Female of Child Bearing Age (11 -55 yrs old): Yes


Last Menstrual Period: 16


Patient Pregnant: No





- Smoking Cessation


Smoking history: Current every day smoker


Have you smoked in the past 12 months: No


Aproximately how many cigarettes per day: 20


If you are a former smoker, when did you quit?: 14 YRS. AGO


Hx Chewing Tobacco Use: No


Initiated information on smoking cessation: Yes


'Breaking Loose' booklet given: 10/05/18





- Substance & Tx. History


Hx Alcohol Use: No


Hx Substance Use: Yes


Substance Use Type: Heroin


Hx Substance Use Treatment: Yes (in [facility]  in [facility])





- Substances Abused


  ** Heroin


Route: Injection


Frequency: Daily


Amount used: 8 BAGS


Age of first use: 14


Date of Last Use: 10/05/18





Family Disease History





- Family Disease History


Family Disease History: Other: Mother (ETOH DEPENDENT)





Admission Physical Exam [facility]





- Vital Signs


Vital Signs: 


 Vital Signs - 24 hr











  10/05/18





  12:39


 


Temperature 96.9 F L


 


Pulse Rate 69


 


Respiratory 18





Rate 


 


Blood Pressure 104/66














- Physical


General Appearance: Yes: Moderate Distress, Tremorous, Irritable, Sweating, 

Anxious


HEENTM: Yes: TINO, Pharynx Normal


Respiratory: Yes: Within Normal Limits, Lungs Clear, Normal Breath Sounds


Neck: Yes: Within Normal Limits, No masses,lesions,Nodules, Supple, Trachea in 

good position


Breast: Yes: Breast Exam Deferred


Cardiology: Yes: Within Normal Limits, Regular Rhythm, Regular Rate, S1, S2


Abdominal: Yes: Within Normal Limits, Normal Bowel Sounds, Non Tender, Flat, 

Soft


Genitourinary: Yes: Within Normal Limits


Back: Yes: Muscle Spasm


Musculoskeletal: Yes: full range of Motion, Back pain, Muscle Pain


Extremities: Yes: Within Normal Limits, Normal Range of Motion, Tremors


Neurological: Yes: CNs II-XII NML intact, Fully Oriented, Alert, Motor Strength 

5/5


Integumentary: Yes: Dry, Track Marks (cellulitis of right ankle)


Lymphatic: Yes: Within Normal Limits





- Diagnostic


(1) Opioid dependence with withdrawal


Current Visit: Yes   Status: Acute   





(2) Seizure


Current Visit: Yes   Status: Acute   





(3) Cellulitis of right ankle


Current Visit: Yes   Status: Acute   





(4) ENDOMETRIOSIS


Current Visit: No   Status: Active   





(5) Nicotine dependence


Current Visit: Yes   Status: Acute   





(6) Hepatitis C


Current Visit: Yes   Status: Acute   





Cleared for Admission [facility]





- Detox or Rehab


[facility] Level of Care: Medically Managed


Detox Regimen/Protocol: Methadone





[facility] Breath Alcohol Content


Breath Alcohol Content: 0





Urine Pregancy Test





- Result


Urine Pregnancy Test Results: Negative- NO Line Present





Urine Drug Screen





- Results


Drug Screen Negative: No


Urine Drug Screen Results: OPI-Opiates, BZO-Benzodiazepines, OXY-Oxycodone

## 2018-10-06 LAB
ALBUMIN SERPL-MCNC: 2.9 G/DL (ref 3.4–5)
ALP SERPL-CCNC: 90 U/L (ref 45–117)
ALT SERPL-CCNC: 59 U/L (ref 13–61)
ANION GAP SERPL CALC-SCNC: 6 MMOL/L (ref 8–16)
APPEARANCE UR: (no result)
AST SERPL-CCNC: 54 U/L (ref 15–37)
BILIRUB SERPL-MCNC: 0.2 MG/DL (ref 0.2–1)
BILIRUB UR STRIP.AUTO-MCNC: NEGATIVE MG/DL
BUN SERPL-MCNC: 21 MG/DL (ref 7–18)
CALCIUM SERPL-MCNC: 8.5 MG/DL (ref 8.5–10.1)
CHLORIDE SERPL-SCNC: 104 MMOL/L (ref 98–107)
CO2 SERPL-SCNC: 27 MMOL/L (ref 21–32)
COLOR UR: YELLOW
CREAT SERPL-MCNC: 0.8 MG/DL (ref 0.55–1.3)
DEPRECATED RDW RBC AUTO: 13.5 % (ref 11.6–15.6)
EPITH CASTS URNS QL MICRO: (no result) /HPF
GLUCOSE SERPL-MCNC: 94 MG/DL (ref 74–106)
HCT VFR BLD CALC: 38 % (ref 32.4–45.2)
HGB BLD-MCNC: 12.5 GM/DL (ref 10.7–15.3)
HYALINE CASTS URNS QL MICRO: 1 /LPF
KETONES UR QL STRIP: NEGATIVE
LEUKOCYTE ESTERASE UR QL STRIP.AUTO: NEGATIVE
MCH RBC QN AUTO: 27.5 PG (ref 25.7–33.7)
MCHC RBC AUTO-ENTMCNC: 32.9 G/DL (ref 32–36)
MCV RBC: 83.6 FL (ref 80–96)
MUCOUS THREADS URNS QL MICRO: (no result)
NITRITE UR QL STRIP: NEGATIVE
PH UR: 5 [PH] (ref 5–8)
PLATELET # BLD AUTO: 205 K/MM3 (ref 134–434)
PMV BLD: 8.2 FL (ref 7.5–11.1)
POTASSIUM SERPLBLD-SCNC: 4.5 MMOL/L (ref 3.5–5.1)
PROT SERPL-MCNC: 6.7 G/DL (ref 6.4–8.2)
PROT UR QL STRIP: NEGATIVE
PROT UR QL STRIP: NEGATIVE
RBC # BLD AUTO: 4.55 M/MM3 (ref 3.6–5.2)
SODIUM SERPL-SCNC: 137 MMOL/L (ref 136–145)
SP GR UR: 1.02 (ref 1.01–1.03)
UROBILINOGEN UR STRIP-MCNC: NEGATIVE MG/DL (ref 0.2–1)
WBC # BLD AUTO: 3.3 K/MM3 (ref 4–10)

## 2018-10-06 RX ADMIN — LEVETIRACETAM SCH MG: 250 TABLET, FILM COATED ORAL at 10:25

## 2018-10-06 RX ADMIN — GABAPENTIN SCH MG: 400 CAPSULE ORAL at 14:03

## 2018-10-06 RX ADMIN — DOCUSATE SODIUM SCH MG: 100 CAPSULE, LIQUID FILLED ORAL at 22:25

## 2018-10-06 RX ADMIN — IBUPROFEN PRN MG: 400 TABLET, FILM COATED ORAL at 20:53

## 2018-10-06 RX ADMIN — NICOTINE POLACRILEX PRN MG: 2 GUM, CHEWING ORAL at 03:31

## 2018-10-06 RX ADMIN — TRAZODONE HYDROCHLORIDE SCH MG: 100 TABLET ORAL at 22:25

## 2018-10-06 RX ADMIN — GABAPENTIN SCH MG: 400 CAPSULE ORAL at 22:25

## 2018-10-06 RX ADMIN — HYDROXYZINE PAMOATE PRN MG: 25 CAPSULE ORAL at 05:59

## 2018-10-06 RX ADMIN — HYDROXYZINE PAMOATE PRN MG: 50 CAPSULE ORAL at 22:26

## 2018-10-06 RX ADMIN — Medication SCH MG: at 22:25

## 2018-10-06 RX ADMIN — NICOTINE POLACRILEX PRN MG: 2 GUM, CHEWING ORAL at 05:59

## 2018-10-06 RX ADMIN — NICOTINE SCH: 21 PATCH TRANSDERMAL at 10:26

## 2018-10-06 RX ADMIN — NICOTINE POLACRILEX PRN MG: 2 GUM, CHEWING ORAL at 12:29

## 2018-10-06 RX ADMIN — DOCUSATE SODIUM SCH MG: 100 CAPSULE, LIQUID FILLED ORAL at 14:04

## 2018-10-06 RX ADMIN — LEVETIRACETAM SCH MG: 250 TABLET, FILM COATED ORAL at 22:25

## 2018-10-06 RX ADMIN — NICOTINE POLACRILEX PRN MG: 2 GUM, CHEWING ORAL at 10:26

## 2018-10-06 RX ADMIN — Medication SCH TAB: at 10:24

## 2018-10-06 RX ADMIN — DOCUSATE SODIUM SCH MG: 100 CAPSULE, LIQUID FILLED ORAL at 05:57

## 2018-10-06 RX ADMIN — CYCLOBENZAPRINE HYDROCHLORIDE PRN MG: 10 TABLET, FILM COATED ORAL at 03:29
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
## 2018-10-06 NOTE — PN
[facility] COWS





- Scale


Resting Pulse: 0= [place] 80 or Below


Sweatin= Chills/Flushing


Restless Observation: 1= Difficult to Sit Still


Pupil Size: 1= Pupils >than Normal


Bone or Joint Aches: 2= Severe Diffuse Aches


Runny Nose/ Eye Tearin= Nasal Congestion


GI Upset > 30mins: 0= None


Tremor Observation of Outstretched Hands: 0= None


Yawning Observation: 1= 1-2x During Session


Anxiety or Irritability: 2=Irritable/Anxious


Goose Flesh Skin: 0=Smooth Skin


COWS Score: 9





BHS Progress Note (SOAP)


Subjective: 





PT C/O ANXIOUS, INSOMNIA, SWEATING BODY ACHES


Objective: 





10/06/18 13:49


 Vital Signs











Temperature  97.7 F   10/06/18 10:43


 


Pulse Rate  67   10/06/18 10:43


 


Respiratory Rate  18   10/06/18 10:43


 


Blood Pressure  118/78   10/06/18 10:43


 


O2 Sat by Pulse Oximetry (%)      








 Laboratory Tests











  10/05/18 10/06/18 10/06/18





  21:20 08:00 08:00


 


WBC    3.3 L


 


RBC    4.55


 


Hgb    12.5


 


Hct    38.0


 


MCV    83.6


 


MCH    27.5


 


MCHC    32.9


 


RDW    13.5


 


Plt Count    205  D


 


MPV    8.2


 


Sodium   


 


Potassium   


 


Chloride   


 


Carbon Dioxide   


 


Anion Gap   


 


BUN   


 


Creatinine   


 


Creat Clearance w eGFR   


 


Random Glucose   


 


Calcium   


 


Total Bilirubin   


 


AST   


 


ALT   


 


Alkaline Phosphatase   


 


Total Protein   


 


Albumin   


 


Urine Color  Yellow  


 


Urine Appearance  Cloudy  


 


Urine pH  5.0  


 


Ur Specific Gravity  1.021  


 


Urine Protein  Negative  


 


Urine Glucose (UA)  Negative  


 


Urine Ketones  Negative  


 


Urine Blood  2+ H  


 


Urine Nitrite  Negative  


 


Urine Bilirubin  Negative  


 


Urine Urobilinogen  Negative  


 


Ur Leukocyte Esterase  Negative  


 


Urine WBC (Auto)  2  


 


Urine RBC (Auto)  14  


 


Ur Epithelial Cells  Few  


 


Hyaline Casts  1  


 


Urine Mucus  Rare  


 


RPR Titer   


 


HIV 1&2 Antibody Screen   Negative 


 


HIV P24 Antigen   Negative 














  10/06/18 10/06/18





  08:00 08:00


 


WBC  


 


RBC  


 


Hgb  


 


Hct  


 


MCV  


 


MCH  


 


MCHC  


 


RDW  


 


Plt Count  


 


MPV  


 


Sodium  137 


 


Potassium  4.5 


 


Chloride  104 


 


Carbon Dioxide  27 


 


Anion Gap  6 L 


 


BUN  21 H 


 


Creatinine  0.8 


 


Creat Clearance w eGFR  > 60 


 


Random Glucose  94 


 


Calcium  8.5 


 


Total Bilirubin  0.2 


 


AST  54 H 


 


ALT  59 


 


Alkaline Phosphatase  90 


 


Total Protein  6.7 


 


Albumin  2.9 L 


 


Urine Color  


 


Urine Appearance  


 


Urine pH  


 


Ur Specific Gravity  


 


Urine Protein  


 


Urine Glucose (UA)  


 


Urine Ketones  


 


Urine Blood  


 


Urine Nitrite  


 


Urine Bilirubin  


 


Urine Urobilinogen  


 


Ur Leukocyte Esterase  


 


Urine WBC (Auto)  


 


Urine RBC (Auto)  


 


Ur Epithelial Cells  


 


Hyaline Casts  


 


Urine Mucus  


 


RPR Titer   Nonreactive


 


HIV 1&2 Antibody Screen  


 


HIV P24 Antigen  








ALERT AND ORIENTED


SKIN WARM AND MOIST


AMB AD NNACY


EXT MILD TREMORS


ANXIOUS, PACING IN HALLWAY


10/06/18 13:50





Assessment: 





10/06/18 13:51


WITHDRAWAL SYNDROME


Plan: 





DETOX AS ORDERED


CONTINUE ORAL FLUIDS


VISTARIL CHANGED TO 50MG EVERY 6 HOURS


CONTINUE TO MONITOR CLINICALLY

## 2018-10-06 NOTE — EKG
Test Reason : 

Blood Pressure : ***/*** mmHG

Vent. Rate : 052 BPM     Atrial Rate : 052 BPM

   P-R Int : 122 ms          QRS Dur : 084 ms

    QT Int : 442 ms       P-R-T Axes : 036 075 027 degrees

   QTc Int : 411 ms

 

SINUS BRADYCARDIA

LOW VOLTAGE QRS

BORDERLINE ECG

NO PREVIOUS ECGS AVAILABLE

CLINICAL CORRELATION IS RECOMMENDED

Confirmed by KYLE LEE, ONEIL ([id number]) on 10/6/2018 5:28:46 PM

 

Referred By: Shakira Kramer           Confirmed By:ONEIL WRIGHT MD

## 2018-10-07 RX ADMIN — HYDROXYZINE PAMOATE PRN MG: 50 CAPSULE ORAL at 23:02

## 2018-10-07 RX ADMIN — DOCUSATE SODIUM SCH MG: 100 CAPSULE, LIQUID FILLED ORAL at 22:14

## 2018-10-07 RX ADMIN — CYCLOBENZAPRINE HYDROCHLORIDE PRN MG: 10 TABLET, FILM COATED ORAL at 13:32

## 2018-10-07 RX ADMIN — TRAZODONE HYDROCHLORIDE SCH MG: 100 TABLET ORAL at 22:14

## 2018-10-07 RX ADMIN — CITALOPRAM HYDROBROMIDE SCH MG: 10 TABLET ORAL at 10:32

## 2018-10-07 RX ADMIN — IBUPROFEN PRN MG: 400 TABLET, FILM COATED ORAL at 15:13

## 2018-10-07 RX ADMIN — CEPHALEXIN SCH MG: 500 CAPSULE ORAL at 22:14

## 2018-10-07 RX ADMIN — NICOTINE POLACRILEX PRN MG: 2 GUM, CHEWING ORAL at 13:33

## 2018-10-07 RX ADMIN — LEVETIRACETAM SCH MG: 250 TABLET, FILM COATED ORAL at 10:33

## 2018-10-07 RX ADMIN — HYDROXYZINE PAMOATE PRN MG: 50 CAPSULE ORAL at 13:32

## 2018-10-07 RX ADMIN — RANITIDINE SCH MG: 150 TABLET ORAL at 22:14

## 2018-10-07 RX ADMIN — NICOTINE POLACRILEX PRN MG: 2 GUM, CHEWING ORAL at 16:46

## 2018-10-07 RX ADMIN — DOCUSATE SODIUM SCH MG: 100 CAPSULE, LIQUID FILLED ORAL at 05:33

## 2018-10-07 RX ADMIN — NICOTINE POLACRILEX PRN MG: 2 GUM, CHEWING ORAL at 10:37

## 2018-10-07 RX ADMIN — GABAPENTIN SCH MG: 400 CAPSULE ORAL at 05:33

## 2018-10-07 RX ADMIN — GABAPENTIN SCH MG: 400 CAPSULE ORAL at 22:14

## 2018-10-07 RX ADMIN — Medication SCH MG: at 22:14

## 2018-10-07 RX ADMIN — LEVETIRACETAM SCH MG: 250 TABLET, FILM COATED ORAL at 22:14

## 2018-10-07 RX ADMIN — DOCUSATE SODIUM SCH MG: 100 CAPSULE, LIQUID FILLED ORAL at 13:31

## 2018-10-07 RX ADMIN — NICOTINE SCH: 21 PATCH TRANSDERMAL at 10:33

## 2018-10-07 RX ADMIN — Medication SCH TAB: at 10:33

## 2018-10-07 RX ADMIN — GABAPENTIN SCH MG: 400 CAPSULE ORAL at 13:32

## 2018-10-07 NOTE — PN
[facility] COWS





- Scale


Resting Pulse: 1= [place] 


Sweatin= Chills/Flushing


Restless Observation: 1= Difficult to Sit Still


Pupil Size: 1= Pupils >than Normal


Bone or Joint Aches: 2= Severe Diffuse Aches


Runny Nose/ Eye Tearin= Nasal Congestion


GI Upset > 30mins: 1= Stomach Cramp


Tremor Observation of Outstretched Hands: 2= Slight Tremor Visible


Yawning Observation: 2= >3x During Session


Anxiety or Irritability: 2=Irritable/Anxious


Goose Flesh Skin: 0=Smooth Skin


COWS Score: 14





[facility]HS Progress Note (SOAP)


Subjective: 





sweat tremor restlessness anxiety 


abscess right ankle sweat erythema tenderness, limited range of right ankle 

motion +2 pulses


Objective: 





10/07/18 15:56


 Vital Signs











Temperature  97.0 F L  10/07/18 10:38


 


Pulse Rate  83   10/07/18 10:38


 


Respiratory Rate  18   10/07/18 10:38


 


Blood Pressure  100/69   10/07/18 10:38


 


O2 Sat by Pulse Oximetry (%)      








 Laboratory Last Values











WBC  3.3 K/mm3 (4.0-10.0)  L  10/06/18  08:00    


 


RBC  4.55 M/mm3 (3.60-5.2)   10/06/18  08:00    


 


Hgb  12.5 GM/dL (10.7-15.3)   10/06/18  08:00    


 


Hct  38.0 % (32.4-45.2)   10/06/18  08:00    


 


MCV  83.6 fl (80-96)   10/06/18  08:00    


 


MCH  27.5 pg (25.7-33.7)   10/06/18  08:00    


 


MCHC  32.9 g/dl (32.0-36.0)   10/06/18  08:00    


 


RDW  13.5 % (11.6-15.6)   10/06/18  08:00    


 


Plt Count  205 K/MM3 (134-434)  D 10/06/18  08:00    


 


MPV  8.2 fl (7.5-11.1)   10/06/18  08:00    


 


Sodium  137 mmol/L (136-145)   10/06/18  08:00    


 


Potassium  4.5 mmol/L (3.5-5.1)   10/06/18  08:00    


 


Chloride  104 mmol/L ()   10/06/18  08:00    


 


Carbon Dioxide  27 mmol/L (21-32)   10/06/18  08:00    


 


Anion Gap  6 MMOL/L (8-16)  L  10/06/18  08:00    


 


BUN  21 mg/dL (7-18)  H  10/06/18  08:00    


 


Creatinine  0.8 mg/dL (0.55-1.3)   10/06/18  08:00    


 


Creat Clearance w eGFR  > 60  (>60)   10/06/18  08:00    


 


Random Glucose  94 mg/dL ()   10/06/18  08:00    


 


Calcium  8.5 mg/dL (8.5-10.1)   10/06/18  08:00    


 


Total Bilirubin  0.2 mg/dL (0.2-1)   10/06/18  08:00    


 


AST  54 U/L (15-37)  H  10/06/18  08:00    


 


ALT  59 U/L (13-61)   10/06/18  08:00    


 


Alkaline Phosphatase  90 U/L ()   10/06/18  08:00    


 


Total Protein  6.7 g/dl (6.4-8.2)   10/06/18  08:00    


 


Albumin  2.9 g/dl (3.4-5.0)  L  10/06/18  08:00    


 


Urine Color  Yellow   10/05/18  21:20    


 


Urine Appearance  Cloudy   10/05/18  21:20    


 


Urine pH  5.0  (5.0-8.0)   10/05/18  21:20    


 


Ur Specific Gravity  1.021  (1.010-1.035)   10/05/18  21:20    


 


Urine Protein  Negative  (NEGATIVE)   10/05/18  21:20    


 


Urine Glucose (UA)  Negative  (NEGATIVE)   10/05/18  21:20    


 


Urine Ketones  Negative  (NEGATIVE)   10/05/18  21:20    


 


Urine Blood  2+  (NEGATIVE)  H  10/05/18  21:20    


 


Urine Nitrite  Negative  (NEGATIVE)   10/05/18  21:20    


 


Urine Bilirubin  Negative  (<2.0 mg/dL)   10/05/18  21:20    


 


Urine Urobilinogen  Negative mg/dL (0.2-1.0)   10/05/18  21:20    


 


Ur Leukocyte Esterase  Negative  (NEGATIVE)   10/05/18  21:20    


 


Urine WBC (Auto)  2 /hpf (3-5)   10/05/18  21:20    


 


Urine RBC (Auto)  14 /hpf (0-3)   10/05/18  21:20    


 


Ur Epithelial Cells  Few /HPF (FEW)   10/05/18  21:20    


 


Hyaline Casts  1 /lpf  10/05/18  21:20    


 


Urine Mucus  Rare   10/05/18  21:20    


 


RPR Titer  Nonreactive  (NONREACTIVE)   10/06/18  08:00    


 


HIV 1&2 Antibody Screen  Negative   10/06/18  08:00    


 


HIV P24 Antigen  Negative   10/06/18  08:00    








lab noted


Assessment: 





10/07/18 15:56


withdrawal sx


abscess right ankle


Plan: 





continue detox


keflex


clonidine x 1 now for withdrawal sweating tremor

## 2018-10-08 RX ADMIN — CEPHALEXIN SCH: 500 CAPSULE ORAL at 10:33

## 2018-10-08 RX ADMIN — TRAZODONE HYDROCHLORIDE SCH MG: 100 TABLET ORAL at 22:15

## 2018-10-08 RX ADMIN — HYDROXYZINE PAMOATE PRN MG: 50 CAPSULE ORAL at 12:20

## 2018-10-08 RX ADMIN — ACETAMINOPHEN PRN MG: 325 TABLET ORAL at 17:43

## 2018-10-08 RX ADMIN — GABAPENTIN SCH MG: 400 CAPSULE ORAL at 06:06

## 2018-10-08 RX ADMIN — RANITIDINE SCH MG: 150 TABLET ORAL at 22:16

## 2018-10-08 RX ADMIN — LEVETIRACETAM SCH MG: 250 TABLET, FILM COATED ORAL at 10:28

## 2018-10-08 RX ADMIN — NICOTINE POLACRILEX PRN MG: 2 GUM, CHEWING ORAL at 22:15

## 2018-10-08 RX ADMIN — NICOTINE POLACRILEX PRN MG: 2 GUM, CHEWING ORAL at 06:07

## 2018-10-08 RX ADMIN — CYCLOBENZAPRINE HYDROCHLORIDE PRN MG: 10 TABLET, FILM COATED ORAL at 17:43

## 2018-10-08 RX ADMIN — GABAPENTIN SCH MG: 400 CAPSULE ORAL at 22:15

## 2018-10-08 RX ADMIN — HYDROXYZINE PAMOATE PRN MG: 50 CAPSULE ORAL at 20:47

## 2018-10-08 RX ADMIN — Medication SCH TAB: at 10:29

## 2018-10-08 RX ADMIN — NICOTINE POLACRILEX PRN MG: 2 GUM, CHEWING ORAL at 17:44

## 2018-10-08 RX ADMIN — NICOTINE POLACRILEX PRN MG: 2 GUM, CHEWING ORAL at 14:44

## 2018-10-08 RX ADMIN — CEPHALEXIN SCH: 500 CAPSULE ORAL at 22:19

## 2018-10-08 RX ADMIN — NICOTINE SCH: 21 PATCH TRANSDERMAL at 10:29

## 2018-10-08 RX ADMIN — CEPHALEXIN SCH MG: 500 CAPSULE ORAL at 22:15

## 2018-10-08 RX ADMIN — Medication SCH MG: at 22:16

## 2018-10-08 RX ADMIN — GABAPENTIN SCH MG: 400 CAPSULE ORAL at 14:43

## 2018-10-08 RX ADMIN — NICOTINE POLACRILEX PRN MG: 2 GUM, CHEWING ORAL at 10:30

## 2018-10-08 RX ADMIN — CITALOPRAM HYDROBROMIDE SCH MG: 10 TABLET ORAL at 10:28

## 2018-10-08 RX ADMIN — RANITIDINE SCH MG: 150 TABLET ORAL at 10:28

## 2018-10-08 RX ADMIN — DOCUSATE SODIUM SCH MG: 100 CAPSULE, LIQUID FILLED ORAL at 06:07

## 2018-10-08 RX ADMIN — DOCUSATE SODIUM SCH MG: 100 CAPSULE, LIQUID FILLED ORAL at 22:15

## 2018-10-08 RX ADMIN — DOCUSATE SODIUM SCH MG: 100 CAPSULE, LIQUID FILLED ORAL at 14:43

## 2018-10-08 RX ADMIN — CEPHALEXIN SCH MG: 500 CAPSULE ORAL at 10:29

## 2018-10-08 RX ADMIN — NICOTINE POLACRILEX PRN MG: 2 GUM, CHEWING ORAL at 19:47

## 2018-10-08 RX ADMIN — LEVETIRACETAM SCH MG: 250 TABLET, FILM COATED ORAL at 22:15

## 2018-10-08 RX ADMIN — ACETAMINOPHEN PRN MG: 325 TABLET ORAL at 09:55

## 2018-10-08 NOTE — PN
[facility] Progress Note (SOAP)


Subjective: 





joint pain body ache sweat tremor anxiety 


Objective: 





10/08/18 17:41


 Vital Signs











Temperature  96.8 F L  10/08/18 13:24


 


Pulse Rate  61   10/08/18 13:24


 


Respiratory Rate  18   10/08/18 13:24


 


Blood Pressure  128/68   10/08/18 13:24


 


O2 Sat by Pulse Oximetry (%)      








 Laboratory Last Values











WBC  3.3 K/mm3 (4.0-10.0)  L  10/06/18  08:00    


 


RBC  4.55 M/mm3 (3.60-5.2)   10/06/18  08:00    


 


Hgb  12.5 GM/dL (10.7-15.3)   10/06/18  08:00    


 


Hct  38.0 % (32.4-45.2)   10/06/18  08:00    


 


MCV  83.6 fl (80-96)   10/06/18  08:00    


 


MCH  27.5 pg (25.7-33.7)   10/06/18  08:00    


 


MCHC  32.9 g/dl (32.0-36.0)   10/06/18  08:00    


 


RDW  13.5 % (11.6-15.6)   10/06/18  08:00    


 


Plt Count  205 K/MM3 (134-434)  D 10/06/18  08:00    


 


MPV  8.2 fl (7.5-11.1)   10/06/18  08:00    


 


Sodium  137 mmol/L (136-145)   10/06/18  08:00    


 


Potassium  4.5 mmol/L (3.5-5.1)   10/06/18  08:00    


 


Chloride  104 mmol/L ()   10/06/18  08:00    


 


Carbon Dioxide  27 mmol/L (21-32)   10/06/18  08:00    


 


Anion Gap  6 MMOL/L (8-16)  L  10/06/18  08:00    


 


BUN  21 mg/dL (7-18)  H  10/06/18  08:00    


 


Creatinine  0.8 mg/dL (0.55-1.3)   10/06/18  08:00    


 


Creat Clearance w eGFR  > 60  (>60)   10/06/18  08:00    


 


Random Glucose  94 mg/dL ()   10/06/18  08:00    


 


Calcium  8.5 mg/dL (8.5-10.1)   10/06/18  08:00    


 


Total Bilirubin  0.2 mg/dL (0.2-1)   10/06/18  08:00    


 


AST  54 U/L (15-37)  H  10/06/18  08:00    


 


ALT  59 U/L (13-61)   10/06/18  08:00    


 


Alkaline Phosphatase  90 U/L ()   10/06/18  08:00    


 


Total Protein  6.7 g/dl (6.4-8.2)   10/06/18  08:00    


 


Albumin  2.9 g/dl (3.4-5.0)  L  10/06/18  08:00    


 


Urine Color  Yellow   10/05/18  21:20    


 


Urine Appearance  Cloudy   10/05/18  21:20    


 


Urine pH  5.0  (5.0-8.0)   10/05/18  21:20    


 


Ur Specific Gravity  1.021  (1.010-1.035)   10/05/18  21:20    


 


Urine Protein  Negative  (NEGATIVE)   10/05/18  21:20    


 


Urine Glucose (UA)  Negative  (NEGATIVE)   10/05/18  21:20    


 


Urine Ketones  Negative  (NEGATIVE)   10/05/18  21:20    


 


Urine Blood  2+  (NEGATIVE)  H  10/05/18  21:20    


 


Urine Nitrite  Negative  (NEGATIVE)   10/05/18  21:20    


 


Urine Bilirubin  Negative  (<2.0 mg/dL)   10/05/18  21:20    


 


Urine Urobilinogen  Negative mg/dL (0.2-1.0)   10/05/18  21:20    


 


Ur Leukocyte Esterase  Negative  (NEGATIVE)   10/05/18  21:20    


 


Urine WBC (Auto)  2 /hpf (3-5)   10/05/18  21:20    


 


Urine RBC (Auto)  14 /hpf (0-3)   10/05/18  21:20    


 


Ur Epithelial Cells  Few /HPF (FEW)   10/05/18  21:20    


 


Hyaline Casts  1 /lpf  10/05/18  21:20    


 


Urine Mucus  Rare   10/05/18  21:20    


 


RPR Titer  Nonreactive  (NONREACTIVE)   10/06/18  08:00    


 


HIV 1&2 Antibody Screen  Negative   10/06/18  08:00    


 


HIV P24 Antigen  Negative   10/06/18  08:00    








lab noted


Assessment: 


withdrawal sx


Plan: 





continue detox

## 2018-10-09 RX ADMIN — NICOTINE POLACRILEX PRN MG: 2 GUM, CHEWING ORAL at 19:43

## 2018-10-09 RX ADMIN — HYDROXYZINE PAMOATE PRN MG: 50 CAPSULE ORAL at 10:36

## 2018-10-09 RX ADMIN — NICOTINE POLACRILEX PRN MG: 2 GUM, CHEWING ORAL at 22:12

## 2018-10-09 RX ADMIN — GABAPENTIN SCH MG: 400 CAPSULE ORAL at 14:06

## 2018-10-09 RX ADMIN — LEVETIRACETAM SCH MG: 250 TABLET, FILM COATED ORAL at 22:10

## 2018-10-09 RX ADMIN — Medication SCH MG: at 22:11

## 2018-10-09 RX ADMIN — DOCUSATE SODIUM SCH MG: 100 CAPSULE, LIQUID FILLED ORAL at 22:10

## 2018-10-09 RX ADMIN — HYDROXYZINE PAMOATE PRN MG: 50 CAPSULE ORAL at 19:42

## 2018-10-09 RX ADMIN — CEPHALEXIN SCH MG: 500 CAPSULE ORAL at 22:10

## 2018-10-09 RX ADMIN — CEPHALEXIN SCH MG: 500 CAPSULE ORAL at 10:35

## 2018-10-09 RX ADMIN — NICOTINE POLACRILEX PRN MG: 2 GUM, CHEWING ORAL at 05:39

## 2018-10-09 RX ADMIN — NICOTINE SCH MG: 21 PATCH TRANSDERMAL at 10:36

## 2018-10-09 RX ADMIN — CYCLOBENZAPRINE HYDROCHLORIDE PRN MG: 10 TABLET, FILM COATED ORAL at 12:34

## 2018-10-09 RX ADMIN — DOCUSATE SODIUM SCH MG: 100 CAPSULE, LIQUID FILLED ORAL at 05:37

## 2018-10-09 RX ADMIN — NICOTINE POLACRILEX PRN MG: 2 GUM, CHEWING ORAL at 10:37

## 2018-10-09 RX ADMIN — TRAZODONE HYDROCHLORIDE SCH MG: 100 TABLET ORAL at 22:10

## 2018-10-09 RX ADMIN — RANITIDINE SCH MG: 150 TABLET ORAL at 10:36

## 2018-10-09 RX ADMIN — GABAPENTIN SCH MG: 400 CAPSULE ORAL at 22:11

## 2018-10-09 RX ADMIN — DOCUSATE SODIUM SCH MG: 100 CAPSULE, LIQUID FILLED ORAL at 14:06

## 2018-10-09 RX ADMIN — LEVETIRACETAM SCH MG: 250 TABLET, FILM COATED ORAL at 10:34

## 2018-10-09 RX ADMIN — RANITIDINE SCH MG: 150 TABLET ORAL at 22:11

## 2018-10-09 RX ADMIN — CITALOPRAM HYDROBROMIDE SCH MG: 10 TABLET ORAL at 10:35

## 2018-10-09 RX ADMIN — HYDROXYZINE PAMOATE PRN MG: 50 CAPSULE ORAL at 05:39

## 2018-10-09 RX ADMIN — NICOTINE POLACRILEX PRN MG: 2 GUM, CHEWING ORAL at 14:07

## 2018-10-09 RX ADMIN — Medication SCH TAB: at 10:36

## 2018-10-09 RX ADMIN — NICOTINE POLACRILEX PRN MG: 2 GUM, CHEWING ORAL at 18:03

## 2018-10-09 RX ADMIN — GABAPENTIN SCH MG: 400 CAPSULE ORAL at 05:37

## 2018-10-09 RX ADMIN — CYCLOBENZAPRINE HYDROCHLORIDE PRN MG: 10 TABLET, FILM COATED ORAL at 22:10

## 2018-10-09 NOTE — PN
[facility] Progress Note (SOAP)


Subjective: 





anxiety


sweats


low back pain


Objective: 





10/09/18 09:48


 Vital Signs











Temperature  98.1 F   10/09/18 09:47


 


Pulse Rate  113 H  10/09/18 09:47


 


Respiratory Rate  18   10/09/18 09:47


 


Blood Pressure  103/75   10/09/18 09:47


 


O2 Sat by Pulse Oximetry (%)      








aaox3


ambulating


no acute distress


Assessment: 





10/09/18 09:49


mild withdrawal sx


Plan: 





continue detox


increase fluids


motrin 600mg prn


lidocaine patch


d/c in am

## 2018-10-10 VITALS — TEMPERATURE: 97.7 F | HEART RATE: 97 BPM | DIASTOLIC BLOOD PRESSURE: 64 MMHG | SYSTOLIC BLOOD PRESSURE: 103 MMHG

## 2018-10-10 RX ADMIN — RANITIDINE SCH MG: 150 TABLET ORAL at 10:34

## 2018-10-10 RX ADMIN — NICOTINE SCH MG: 21 PATCH TRANSDERMAL at 10:38

## 2018-10-10 RX ADMIN — NICOTINE POLACRILEX PRN MG: 2 GUM, CHEWING ORAL at 09:22

## 2018-10-10 RX ADMIN — LEVETIRACETAM SCH MG: 250 TABLET, FILM COATED ORAL at 10:34

## 2018-10-10 RX ADMIN — GABAPENTIN SCH MG: 400 CAPSULE ORAL at 05:51

## 2018-10-10 RX ADMIN — DOCUSATE SODIUM SCH MG: 100 CAPSULE, LIQUID FILLED ORAL at 05:51

## 2018-10-10 RX ADMIN — Medication SCH TAB: at 10:34

## 2018-10-10 RX ADMIN — CITALOPRAM HYDROBROMIDE SCH MG: 10 TABLET ORAL at 10:34

## 2018-10-10 RX ADMIN — NICOTINE POLACRILEX PRN MG: 2 GUM, CHEWING ORAL at 05:59

## 2018-10-10 RX ADMIN — CEPHALEXIN SCH MG: 500 CAPSULE ORAL at 10:34

## 2018-10-10 NOTE — DS
[facility] Detox Discharge Summary


Admission Date: 


10/05/18





Discharge Date: 10/10/18





- History


Present History: Opioid Dependence





- Physical Exam Results


Vital Signs: 


 Vital Signs











Temperature  97.9 F   10/10/18 07:40


 


Pulse Rate  91 H  10/10/18 07:40


 


Respiratory Rate  18   10/10/18 07:40


 


Blood Pressure  102/69   10/10/18 07:40


 


O2 Sat by Pulse Oximetry (%)      














- Treatment


Hospital Course: Detox Protocol Followed, Detoxed Safely, Responded well, 

Discharged Condition Good, Rehab Referral Accepted





- Medication


Discharge Medications: 


Ambulatory Orders





Citalopram Hydrobromide [Celexa -] 20 mg PO DAILY 12/09/13 


Gabapentin 400 mg PO TID 10/05/18 


hydrOXYzine PAMOATE [Vistaril -] 50 mg PO TID PRN 10/05/18 


levETIRAcetam [Keppra -] 250 mg PO BID 10/05/18 


traZODone HCL [Trazodone HCl] 100 mg PO HS 10/05/18 











- Diagnosis


(1) Anxiety and depression


Current Visit: Yes   Status: Acute   





(2) Anxiety disorder


Current Visit: Yes   Status: Acute   





(3) Cellulitis of right ankle


Current Visit: Yes   Status: Acute   





(4) Hepatitis C


Current Visit: Yes   Status: Acute   





(5) Insomnia


Current Visit: Yes   Status: Acute   





(6) Nicotine dependence


Current Visit: Yes   Status: Chronic   


Qualifiers: 


   Nicotine product type: cigarettes   Substance use status: uncomplicated   

Qualified Code(s): F17.210 - Nicotine dependence, cigarettes, uncomplicated   





(7) Opioid dependence with withdrawal


Current Visit: Yes   Status: Chronic   





(8) Seizure


Current Visit: No   Status: Suspected   





(9) Substance induced mood disorder


Current Visit: Yes   Status: Acute   





(10) Depressive disorder


Current Visit: Yes   Status: Chronic   





(11) ANXIETY


Current Visit: No   Status: Active   





(12) BENZO. DEPENDENCE


Current Visit: No   Status: Active   





(13) DEPRESSION


Current Visit: No   Status: Active   





(14) ENDOMETRIOSIS


Current Visit: No   Status: Active   





- AMA


Did Patient Leave Against Medical Advice: No